# Patient Record
Sex: MALE | Race: WHITE | URBAN - METROPOLITAN AREA
[De-identification: names, ages, dates, MRNs, and addresses within clinical notes are randomized per-mention and may not be internally consistent; named-entity substitution may affect disease eponyms.]

---

## 2024-07-01 ENCOUNTER — OFFICE VISIT (OUTPATIENT)
Dept: FAMILY MEDICINE CLINIC | Facility: CLINIC | Age: 39
End: 2024-07-01
Payer: COMMERCIAL

## 2024-07-01 VITALS
HEART RATE: 83 BPM | SYSTOLIC BLOOD PRESSURE: 134 MMHG | RESPIRATION RATE: 18 BRPM | HEIGHT: 72 IN | DIASTOLIC BLOOD PRESSURE: 84 MMHG | WEIGHT: 238 LBS | TEMPERATURE: 97 F | OXYGEN SATURATION: 97 % | BODY MASS INDEX: 32.23 KG/M2

## 2024-07-01 DIAGNOSIS — J02.9 SORE THROAT: Primary | ICD-10-CM

## 2024-07-01 LAB
CONTROL LINE PRESENT WITH A CLEAR BACKGROUND (YES/NO): YES YES/NO
KIT LOT #: NORMAL NUMERIC
STREP GRP A CUL-SCR: NEGATIVE

## 2024-07-01 PROCEDURE — 87081 CULTURE SCREEN ONLY: CPT | Performed by: NURSE PRACTITIONER

## 2024-07-01 NOTE — PROGRESS NOTES
CHIEF COMPLAINT:     Chief Complaint   Patient presents with    Sore Throat       HPI:   Kyle Weaver is a 39 year old male who presents for sore throat. Patient reports symptoms present over the past week. Notes mild nasal symptoms initially but that has improved and the sore throat has lingered. Notes daughter recently tested positive for strep.  Denies any fevers, chills, wheezing, chest discomfort, or shortness of breath. Treating symptoms with otc cold meds.   Tolerates PO well at home. No n/v/d.  Denies any other aggravating or relieving factors at home. Denies any other treatment attempts prior to arrival.   Denies known covid-19 exposure.    No current outpatient medications on file.      History reviewed. No pertinent past medical history.   History reviewed. No pertinent surgical history.      Social History     Socioeconomic History    Marital status:    Tobacco Use    Smoking status: Never    Smokeless tobacco: Never   Vaping Use    Vaping status: Never Used     Social Determinants of Health      Received from [facility]    Social Connections    Received from [facility]    Housing Stability         REVIEW OF SYSTEMS:   GENERAL: Denies fever. Notes good appetite  SKIN: no rashes or abnormal skin lesions  HEENT: + sore throat, + mild sinus symptoms, Denies ear pain  LUNGS: Denies cough, denies shortness of breath or wheezing,   CARDIOVASCULAR: denies chest pain or palpitations   GI: denies N/V/C or abdominal pain  NEURO: Denies headaches    EXAM:   /84   Pulse 83   Temp 97.2 °F (36.2 °C)   Resp 18   Ht 6' (1.829 m)   Wt 238 lb (108 kg)   SpO2 97%   BMI 32.28 kg/m²   GENERAL: well developed, well nourished,in no apparent distress  SKIN: no rashes,no suspicious lesions  HEAD: atraumatic, normocephalic.    EYES: conjunctiva clear  EARS: TM's intact and without erythema, no bulging, no retraction,no fluid, bony landmarks visualized. No erythema or  swelling noted to ear canals or external ears.   NOSE: Nostrils patent, clear nasal mucous, nasal mucosa wnl  THROAT: Oral mucosa pink, moist. Posterior pharynx is mildly erythematous. No exudates. No tonsillar hypertrophy noted.  No trismus. Uvula midline with no swelling. Voice clear/normal. No stridor  NECK: Supple, non-tender  LUNGS: clear to auscultation bilaterally, no rales, wheezes or rhonchi. Breathing is non labored.  CARDIO: RRR without murmur  EXTREMITIES: no cyanosis, clubbing or edema  LYMPH:  No lymphadenopathy.        ASSESSMENT AND PLAN:       ICD-10-CM    1. Sore throat  J02.9 Strep A Assay W/Optic     Grp A Strep Cult, Throat     Grp A Strep Cult, Throat          Rapid strep negative.   Viral testing declined.    Discussed physical exam and hpi with pt.  Pt has reassuring physical exam consistent with pharyngitis and mild uri symptoms.  No signs of pta or retropharyngeal infection.Lungs clear bilat. No respiratory distress noted.  Treatment options discussed with patient and explained in detail. We reviewed symptomatic care at home. The risks, benefits and potential side effects of possible medications were reviewed. Alternatives were discussed. Monitoring parameters and expected course outlined. Patient to call PCP or go to emergency department if symptoms fail to respond as outlined, or worsen in any way. The patient agreed with the plan.      Patient Instructions   1. Rest. Drink plenty of fluids.  2. Supportive care as discussed.   3. Salt water gargles three times daily  4. Use humidifier at home when possible.  5. The rapid strep test was negative today. We will send a throat culture to lab and call you with results in 3-4 days.  6. Viral testing declined.     7. Follow up with PMD in 4-5 days for re-eval. Go to the emergency department immediately if symptoms worsen, change, you develop chest discomfort, wheezing, shortness of breath, or if you have any concerns.        The patient  indicates understanding of these issues and agrees to the plan.

## 2024-07-01 NOTE — PATIENT INSTRUCTIONS
1. Rest. Drink plenty of fluids.  2. Supportive care as discussed.   3. Salt water gargles three times daily  4. Use humidifier at home when possible.  5. The rapid strep test was negative today. We will send a throat culture to lab and call you with results in 3-4 days.  6. Viral testing declined.     7. Follow up with PMD in 4-5 days for re-eval. Go to the emergency department immediately if symptoms worsen, change, you develop chest discomfort, wheezing, shortness of breath, or if you have any concerns.

## 2025-01-28 NOTE — ED PROVIDER NOTES
Patient Seen in: [facility]      History     Chief Complaint   Patient presents with    Cough/URI     Stated Complaint: lung congestion; cough; had fever before    Subjective:   39-year-old male here for evaluation of cough.  Started Friday with bodyaches and chills.  Later that day he had a fever and development of a cough.  States all other symptoms have resolved except for cough.  He has been taking Mucinex as he feels that there is chest congestion.  He came in here because cough lingers, and he has a son who was recently diagnosed with pneumonia.  Non-smoker.  Denies history of asthma.              Objective:     History reviewed. No pertinent past medical history.           History reviewed. No pertinent surgical history.             Social History     Socioeconomic History    Marital status:    Tobacco Use    Smoking status: Never    Smokeless tobacco: Never   Vaping Use    Vaping status: Never Used     Social Drivers of Health      Received from [facility]    Social Connections    Received from [facility]    Housing Stability              Review of Systems   Constitutional: Negative.    Respiratory:  Positive for cough.    All other systems reviewed and are negative.      Positive for stated complaint: lung congestion; cough; had fever before  Other systems are as noted in HPI.  Constitutional and vital signs reviewed.      All other systems reviewed and negative except as noted above.    Physical Exam     ED Triage Vitals [01/28/25 0910]   /80   Pulse 95   Resp 16   Temp 97.8 °F (36.6 °C)   Temp src Oral   SpO2 97 %   O2 Device        Current Vitals:   Vital Signs  BP: 134/80  Pulse: 95  Resp: 16  Temp: 97.8 °F (36.6 °C)  Temp src: Oral    Oxygen Therapy  SpO2: 97 %        Physical Exam  Vitals and nursing note reviewed.   Constitutional:       General: He is not in acute distress.     Appearance: Normal appearance. He is not ill-appearing,  toxic-appearing or diaphoretic.   Cardiovascular:      Rate and Rhythm: Normal rate and regular rhythm.      Pulses: Normal pulses.      Heart sounds: Normal heart sounds.   Pulmonary:      Effort: Pulmonary effort is normal. No respiratory distress.      Breath sounds: Normal breath sounds. No stridor. No wheezing, rhonchi or rales.   Skin:     General: Skin is warm and dry.   Neurological:      General: No focal deficit present.      Mental Status: He is alert.   Psychiatric:         Mood and Affect: Mood normal.             ED Course   Labs Reviewed - No data to display     XR CHEST PA + LAT CHEST (CPT=71046)    Result Date: 1/28/2025  PROCEDURE:  XR CHEST PA + LAT CHEST (CPT=71046)  INDICATIONS:  Cough and fever for 4 days.  COMPARISON:  ANNABLELA VELASQUEZ, CHEST PA   LATERAL, 5/17/2008, 11:08 AM.  TECHNIQUE:  PA and lateral chest radiographs were obtained.  PATIENT STATED HISTORY: (As transcribed by Technologist)  Patient states symptoms started 4 days ago with fever and cough. Fever has resolved.    FINDINGS:  There are subtle ground-glass type airspace opacities identified within the left lower lobe, suspicious for mild pneumonia.  Please correlate clinically.  Right lung is clear.  Cardiac silhouette is unremarkable.  No pleural disease identified.            CONCLUSION:  Subtle ground-glass type airspace opacities within the left lower lobe, suspicious for a mild pneumonia.   LOCATION:  [facility]   Dictated by (CST): Evelia Rodriguez DO on 1/28/2025 at 9:41 AM     Finalized by (CST): Evelia Rodriguez DO on 1/28/2025 at 9:43 AM                 MDM              Medical Decision Making  Adult male patient with a lingering cough with chest congestion.  There is no adventitious breath sounds, no conversational dyspnea, no other signs of respiratory distress.  Differential diagnosis considered include viral versus bacterial etiology.  Will obtain x-ray rule out pneumonia.    I personally viewed, independently interpreted and  radiology report was reviewed.  Subtle opacity in the left lower lobe, concerning for pneumonia.  Will empirically treat with Z-Tiffany and Augmentin.     Supportive/home management of diagnosis/illness/injury discussed. Red flag symptoms discussed.  Signs and symptoms/criteria that would necessitate reevaluation, including ER evaluation discussed.  Patient and/or responsible adult verbalize and agree with management and plan of care.    Speech recognition software was used during this dictation.  There may be minor errors in transcription.      Amount and/or Complexity of Data Reviewed  Radiology: ordered and independent interpretation performed. Decision-making details documented in ED Course.    Risk  Prescription drug management.        Disposition and Plan     Clinical Impression:  1. Acute cough    2. Pneumonia of left lower lobe due to infectious organism         Disposition:  Discharge  1/28/2025  9:51 am    Follow-up:  Your primary care provider    Call in 2 days            Medications Prescribed:  Current Discharge Medication List        START taking these medications    Details   amoxicillin clavulanate 875-125 MG Oral Tab Take 1 tablet by mouth 2 (two) times daily for 7 days.  Qty: 14 tablet, Refills: 0      azithromycin (ZITHROMAX Z-TIFFANY) 250 MG Oral Tab 500 mg once followed by 250 mg daily x 4 days  Qty: 6 tablet, Refills: 0      benzonatate 100 MG Oral Cap Take 1 capsule (100 mg total) by mouth 3 (three) times daily as needed for cough.  Qty: 30 capsule, Refills: 0                 Supplementary Documentation:

## 2025-01-28 NOTE — ED INITIAL ASSESSMENT (HPI)
Pt c/o cough since Friday. Had body aches and a fever when illness started but those have resolved. Son has pneumonia.

## 2025-02-01 NOTE — PROGRESS NOTES
Called pt, pt is not in state of [place]. Currently in [place]. Unable to do ODVV, will go to local urgent care. NO charge.